# Patient Record
Sex: MALE | Race: ASIAN | NOT HISPANIC OR LATINO
[De-identification: names, ages, dates, MRNs, and addresses within clinical notes are randomized per-mention and may not be internally consistent; named-entity substitution may affect disease eponyms.]

---

## 2017-05-16 ENCOUNTER — TRANSCRIPTION ENCOUNTER (OUTPATIENT)
Age: 27
End: 2017-05-16

## 2017-06-13 ENCOUNTER — TRANSCRIPTION ENCOUNTER (OUTPATIENT)
Age: 27
End: 2017-06-13

## 2018-04-07 ENCOUNTER — TRANSCRIPTION ENCOUNTER (OUTPATIENT)
Age: 28
End: 2018-04-07

## 2018-04-13 ENCOUNTER — TRANSCRIPTION ENCOUNTER (OUTPATIENT)
Age: 28
End: 2018-04-13

## 2019-06-07 ENCOUNTER — TRANSCRIPTION ENCOUNTER (OUTPATIENT)
Age: 29
End: 2019-06-07

## 2020-03-23 ENCOUNTER — TRANSCRIPTION ENCOUNTER (OUTPATIENT)
Age: 30
End: 2020-03-23

## 2020-04-26 ENCOUNTER — MESSAGE (OUTPATIENT)
Age: 30
End: 2020-04-26

## 2020-05-06 ENCOUNTER — APPOINTMENT (OUTPATIENT)
Dept: DISASTER EMERGENCY | Facility: HOSPITAL | Age: 30
End: 2020-05-06

## 2020-05-07 LAB
SARS-COV-2 IGG SERPL IA-ACNC: 2.8 INDEX
SARS-COV-2 IGG SERPL QL IA: POSITIVE

## 2022-08-26 ENCOUNTER — APPOINTMENT (OUTPATIENT)
Dept: CT IMAGING | Facility: CLINIC | Age: 32
End: 2022-08-26

## 2022-09-02 ENCOUNTER — APPOINTMENT (OUTPATIENT)
Dept: CT IMAGING | Facility: CLINIC | Age: 32
End: 2022-09-02

## 2022-09-02 ENCOUNTER — OUTPATIENT (OUTPATIENT)
Dept: OUTPATIENT SERVICES | Facility: HOSPITAL | Age: 32
LOS: 1 days | End: 2022-09-02
Payer: COMMERCIAL

## 2022-09-02 DIAGNOSIS — Z00.8 ENCOUNTER FOR OTHER GENERAL EXAMINATION: ICD-10-CM

## 2022-09-02 PROCEDURE — 75574 CT ANGIO HRT W/3D IMAGE: CPT | Mod: 26

## 2022-09-02 PROCEDURE — 75574 CT ANGIO HRT W/3D IMAGE: CPT

## 2022-12-01 ENCOUNTER — NON-APPOINTMENT (OUTPATIENT)
Age: 32
End: 2022-12-01

## 2022-12-02 ENCOUNTER — APPOINTMENT (OUTPATIENT)
Dept: ORTHOPEDIC SURGERY | Facility: CLINIC | Age: 32
End: 2022-12-02

## 2022-12-02 VITALS
DIASTOLIC BLOOD PRESSURE: 89 MMHG | SYSTOLIC BLOOD PRESSURE: 134 MMHG | HEART RATE: 80 BPM | BODY MASS INDEX: 28.93 KG/M2 | HEIGHT: 66 IN | WEIGHT: 180 LBS

## 2022-12-02 DIAGNOSIS — M25.529 PAIN IN UNSPECIFIED ELBOW: ICD-10-CM

## 2022-12-02 PROCEDURE — 99203 OFFICE O/P NEW LOW 30 MIN: CPT

## 2022-12-02 PROCEDURE — 73070 X-RAY EXAM OF ELBOW: CPT | Mod: RT

## 2022-12-03 NOTE — HISTORY OF PRESENT ILLNESS
[de-identified] : JENNIE ORTIZ  is a 32 year year old right-hand-dominant M who presents with right elbow injury.  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  He says that he immediately saw and felt his biceps retract into his upper arm.  After this he had pain and swelling about the elbow which she rated as a 6-7 out of 10.  He went to an urgent care where he was given a sling and told to follow-up.  He reports that he currently feels pain when he tries to supinate his forearm.  He denies any previous injuries like this.\par

## 2022-12-03 NOTE — DISCUSSION/SUMMARY
[de-identified] : Right elbow distal biceps tear.  After long discussion with him and his wife regarding his diagnosis and management.  He appears to have had a tear of the biceps from the radial tuberosity.  This is evidenced by his history and exam findings.  I would like to order an MRI of the right elbow to confirm this.  I discussed with him that with a distal biceps tendon tear that he has, he could expect weakness with supination of the forearm with nonoperative management.  Given that he is relatively young and active and has to use his arm to lift things at work, I would recommend surgery in the form of a distal biceps repair to give him the best elbow function in the future.  I discussed that surgery would include retrieving the torn biceps tendon and then repairing it to the bone likely using a button or anchors.  This would be outpatient surgery and he would be able to go home the same day.  I also discussed the expected rehabilitation protocol which would include the use of a sling for the first 4 to 6 weeks.  He would then do physical therapy in order to improve the range of motion of the elbow.  At 3 months he would do physical therapy in order to work on strengthening of the elbow.  Total time until he is able to return to physical activities would be about 5 to 6 months.  Risks of surgery including but not limited to infection, bleeding, damage to surrounding nerves and blood vessels, need for further surgery were discussed.  I discussed with him that if he does want to pursue operative management, I would recommend that he does this sooner rather than later as if he does wait the biceps tendon can get scarred and retracted further.  After this thorough discussion, he indicated that he would like to proceed with operative management.  We will schedule him for surgery next Thursday.  He will get the MRI in the meantime for surgical planning.  The patient expressed understanding of his diagnosis and treatment plan and all questions were answered.\par \par This note was generated using dragon medical dictation software.  A reasonable effort has been made for proofreading its contents, but typos may still remain.  If there are any questions or points of clarification needed please notify my office.

## 2022-12-03 NOTE — PHYSICAL EXAM
[de-identified] : General: No apparent distress\par Cardiovascular: extremities warm and well-perfused, no cyanosis\par Pulmonary: non labored respirations\par \par Musculoskeletal:\par Left Elbow: \par Skin: Intact, dry, no swelling\par Motor and sensory intact, fingers warm and well-perfused\par Tenderness: None\par Negative hook test\par \par Right Elbow: \par Skin: Swelling and ecchymosis about antecubital fossa\par Motor: Fires FPL/EPL/ALEX/wrist extensors\par Sensory:  SILT median/ulnar/radial distributions\par Vascular: Fingers warm and well-perfused\par Tenderness: Tenderness to palpation about antecubital fossa\par Positive Andrea sign\par Positive hook test\par Weakness with resisted supination [de-identified] : 2 views of the right elbow obtained today and interpreted by me demonstrate no acute fracture or dislocation.  There are no degenerative changes about the elbow

## 2022-12-05 ENCOUNTER — OUTPATIENT (OUTPATIENT)
Dept: OUTPATIENT SERVICES | Facility: HOSPITAL | Age: 32
LOS: 1 days | End: 2022-12-05
Payer: COMMERCIAL

## 2022-12-05 ENCOUNTER — APPOINTMENT (OUTPATIENT)
Dept: MRI IMAGING | Facility: CLINIC | Age: 32
End: 2022-12-05

## 2022-12-05 ENCOUNTER — APPOINTMENT (OUTPATIENT)
Dept: ORTHOPEDIC SURGERY | Facility: CLINIC | Age: 32
End: 2022-12-05

## 2022-12-05 VITALS
DIASTOLIC BLOOD PRESSURE: 87 MMHG | BODY MASS INDEX: 29.99 KG/M2 | HEART RATE: 94 BPM | WEIGHT: 180 LBS | SYSTOLIC BLOOD PRESSURE: 135 MMHG | HEIGHT: 65 IN

## 2022-12-05 DIAGNOSIS — M25.529 PAIN IN UNSPECIFIED ELBOW: ICD-10-CM

## 2022-12-05 PROCEDURE — 73221 MRI JOINT UPR EXTREM W/O DYE: CPT | Mod: 26,RT

## 2022-12-05 PROCEDURE — 99214 OFFICE O/P EST MOD 30 MIN: CPT

## 2022-12-05 PROCEDURE — 73221 MRI JOINT UPR EXTREM W/O DYE: CPT

## 2022-12-06 NOTE — DISCUSSION/SUMMARY
[de-identified] : 31 y/o male with right distal biceps tendon rupture\par \par Patient presents with an acute injury to the right elbow consistent with complete distal biceps tendon avulsion. Discussed risk factors associated including hypervascularity of the tendon, intrinsic degeneration, and possible mechanical impingement. Mechanism of injury typically includes eccentric tension from a flexed to extended elbow position. Discussed treatment alternatives including nonoperative versus operative intervention. Acute operative intervention would repair the tendon back to the radial tuberosity to improve long-term function/strength. Nonoperative management would provide possible functional deficits including loss of 50% of supination strength, 30% of elbow flexion strength and possibly some  strength. \par \par History is consistent with an acute injury to the antecubital fossa, and physical exam suggests deformity to the biceps, loss of supination/flexion strength and positive hook test. I discussed the utility of MRI to distinguish degree of tendon retraction, and degree of tearing. He is scheduled for one today as he has already been evaluated by Dr. Lizama. I discussed that Dr. Lizama has already scheduled him for thursday and that I may not be able to operate any sooner, and that he is a highly qualified surgeon who performs this procedure routinely. We also discussed that ease of surgery is improved in the first couple of weeks post-injury. \par \par All risks, benefits and alternatives to the proposed surgical procedure, right distal biceps tendon repair as well as the need for formal post-operative rehabilitation were discussed in great detail with the patient. Risks include but are not limited to pain, bleeding, infection, neurovascular injury, stiffness, numbness, failure, medical complications (including DVT, PE, MI), and risks of anesthesia. \par \par A discussion was also had with the patient regarding additional precautions during surgery given the recent Covid-19 pandemic; specifically with regards to perioperative care, visitor policy, and pre-admission testing. The patient understands that current protocol requires either documented Covid-19 vaccination or a negative Covid-19 test no more than 48hrs prior to surgery. \par \par Patient questions and concerns were answered. He will obtain MRI today and follow up with Dr. Lizama or myself for surgical scheduling as his schedule allows.. \par \par

## 2022-12-06 NOTE — PHYSICAL EXAM
[de-identified] : Oriented to time, place, person\par Mood: Normal\par Affect: Normal\par Appearance: Healthy, well appearing, no acute distress.\par Gait: Normal\par Assistive Devices: None\par \par Right elbow exam:\par \par Skin: Clean, dry, intact. + medial ecchymosis. Mild antecubital swelling. No palpable joint effusion. Deformity to biceps\par ROM: Mild loss terminal motion, full supination/pronation.\par Painful ROM: Pain with extension/supination\par Tenderness: TTP to radial tuberosity, retracted biceps\par Strength: 4/5 elbow flexion, 5/5 elbow extension, 3/5 supination, 5/5 pronation\par Stability: Stable to vaus/valgus stress\par Vasc: 2+ radial pulse, <2s cap refill\par Sensation: In tact to light touch throughout\par Neuro: Negative tinels at ulnar canal, AIN/PIN/Ulnar nerve in tact to motor/sensation  [de-identified] : Images were reviewed dated 12.2.2022\par \par 2 views of the right elbow that show no acute fracture or dislocation. There is no degenerative change seen. There is no gross malalignment. No significant other obvious osseous abnormality, otherwise unremarkable.

## 2022-12-06 NOTE — ADDENDUM
[FreeTextEntry1] : This note was written by Nicole Craig on 12/05/2022 acting solely as a scribe for Dr. Guanako Sprague.\par \par All medical record entries made by the Scribe were at my, Dr. Guanako Sprague, direction and personally dictated by me on 12/05/2022. I have personally reviewed the chart and agree that the record accurately reflects my personal performance of the history, physical exam, assessment and plan.

## 2022-12-06 NOTE — HISTORY OF PRESENT ILLNESS
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 He felt a pop in his elbow lifting a box. He was seen by francisco Gupta with distal biceps tendon rupture and scheduled for sx. He is here for second opinion. The pain is brought on with rotation at the elbow and is taking Ibuprofen. He has MRI scheduled for today. Denies numbness or tingling. \par \par The patient's past medical history, past surgical history, medications and allergies were reviewed by me today with the patient and documented accordingly. In addition, the patient's family and social history, which were noncontributory to this visit, were reviewed also.

## 2022-12-07 ENCOUNTER — OUTPATIENT (OUTPATIENT)
Dept: OUTPATIENT SERVICES | Facility: HOSPITAL | Age: 32
LOS: 1 days | End: 2022-12-07

## 2022-12-07 ENCOUNTER — APPOINTMENT (OUTPATIENT)
Dept: ORTHOPEDIC SURGERY | Facility: CLINIC | Age: 32
End: 2022-12-07

## 2022-12-07 VITALS
TEMPERATURE: 98 F | SYSTOLIC BLOOD PRESSURE: 116 MMHG | HEART RATE: 77 BPM | OXYGEN SATURATION: 99 % | RESPIRATION RATE: 16 BRPM | WEIGHT: 190.04 LBS | HEIGHT: 64 IN | DIASTOLIC BLOOD PRESSURE: 78 MMHG

## 2022-12-07 DIAGNOSIS — J45.909 UNSPECIFIED ASTHMA, UNCOMPLICATED: ICD-10-CM

## 2022-12-07 DIAGNOSIS — Z96.22 MYRINGOTOMY TUBE(S) STATUS: Chronic | ICD-10-CM

## 2022-12-07 DIAGNOSIS — S46.211A STRAIN OF MUSCLE, FASCIA AND TENDON OF OTHER PARTS OF BICEPS, RIGHT ARM, INITIAL ENCOUNTER: ICD-10-CM

## 2022-12-07 DIAGNOSIS — K08.409 PARTIAL LOSS OF TEETH, UNSPECIFIED CAUSE, UNSPECIFIED CLASS: Chronic | ICD-10-CM

## 2022-12-07 DIAGNOSIS — Z90.89 ACQUIRED ABSENCE OF OTHER ORGANS: Chronic | ICD-10-CM

## 2022-12-07 LAB
HCT VFR BLD CALC: 39.4 % — SIGNIFICANT CHANGE UP (ref 39–50)
HGB BLD-MCNC: 12.9 G/DL — LOW (ref 13–17)
MCHC RBC-ENTMCNC: 27.3 PG — SIGNIFICANT CHANGE UP (ref 27–34)
MCHC RBC-ENTMCNC: 32.7 GM/DL — SIGNIFICANT CHANGE UP (ref 32–36)
MCV RBC AUTO: 83.3 FL — SIGNIFICANT CHANGE UP (ref 80–100)
NRBC # BLD: 0 /100 WBCS — SIGNIFICANT CHANGE UP (ref 0–0)
NRBC # FLD: 0 K/UL — SIGNIFICANT CHANGE UP (ref 0–0)
PLATELET # BLD AUTO: 246 K/UL — SIGNIFICANT CHANGE UP (ref 150–400)
RBC # BLD: 4.73 M/UL — SIGNIFICANT CHANGE UP (ref 4.2–5.8)
RBC # FLD: 12.3 % — SIGNIFICANT CHANGE UP (ref 10.3–14.5)
SARS-COV-2 N GENE NPH QL NAA+PROBE: NOT DETECTED
WBC # BLD: 10.84 K/UL — HIGH (ref 3.8–10.5)
WBC # FLD AUTO: 10.84 K/UL — HIGH (ref 3.8–10.5)

## 2022-12-07 PROCEDURE — 93010 ELECTROCARDIOGRAM REPORT: CPT

## 2022-12-07 RX ORDER — IBUPROFEN 200 MG
1 TABLET ORAL
Qty: 0 | Refills: 0 | DISCHARGE

## 2022-12-07 RX ORDER — BUDESONIDE AND FORMOTEROL FUMARATE DIHYDRATE 160; 4.5 UG/1; UG/1
0 AEROSOL RESPIRATORY (INHALATION)
Qty: 0 | Refills: 0 | DISCHARGE

## 2022-12-07 NOTE — H&P PST ADULT - NSICDXPASTSURGICALHX_GEN_ALL_CORE_FT
PAST SURGICAL HISTORY:  H/O mastoidectomy     H/O wisdom tooth extraction     S/P tube myringotomy

## 2022-12-07 NOTE — H&P PST ADULT - HISTORY OF PRESENT ILLNESS
32 year old male with biceps tendon rupture last week, when lifting a heavy box. Pt presents today for presurgical evaluation for ... 32 year old male with biceps tendon rupture last week, when lifting a heavy box. Pt presents today for presurgical evaluation for Right Distal Biceps Tendon Repair.

## 2022-12-07 NOTE — H&P PST ADULT - PROBLEM SELECTOR PLAN 1
Pre-op instructions provided. Pt verbalized understanding.   Pepcid provided for GI prophylaxis.   Pt given detailed verbal and written instructions on chlorhexidine wash. Pt verbalized understanding with teachback.   Pt states he already had preop COVID testing.

## 2022-12-08 ENCOUNTER — APPOINTMENT (OUTPATIENT)
Dept: ORTHOPEDIC SURGERY | Facility: HOSPITAL | Age: 32
End: 2022-12-08

## 2022-12-08 ENCOUNTER — NON-APPOINTMENT (OUTPATIENT)
Age: 32
End: 2022-12-08

## 2022-12-08 ENCOUNTER — TRANSCRIPTION ENCOUNTER (OUTPATIENT)
Age: 32
End: 2022-12-08

## 2022-12-08 RX ORDER — OXYCODONE AND ACETAMINOPHEN 5; 325 MG/1; MG/1
5-325 TABLET ORAL
Qty: 30 | Refills: 0 | Status: ACTIVE | COMMUNITY
Start: 2022-12-08 | End: 1900-01-01

## 2022-12-09 ENCOUNTER — APPOINTMENT (OUTPATIENT)
Dept: ORTHOPEDIC SURGERY | Facility: AMBULATORY SURGERY CENTER | Age: 32
End: 2022-12-09

## 2022-12-09 ENCOUNTER — OUTPATIENT (OUTPATIENT)
Dept: OUTPATIENT SERVICES | Facility: HOSPITAL | Age: 32
LOS: 1 days | Discharge: HOME | End: 2022-12-09
Payer: COMMERCIAL

## 2022-12-09 ENCOUNTER — TRANSCRIPTION ENCOUNTER (OUTPATIENT)
Age: 32
End: 2022-12-09

## 2022-12-09 VITALS
HEART RATE: 76 BPM | RESPIRATION RATE: 16 BRPM | OXYGEN SATURATION: 100 % | DIASTOLIC BLOOD PRESSURE: 68 MMHG | SYSTOLIC BLOOD PRESSURE: 123 MMHG

## 2022-12-09 VITALS
HEIGHT: 64 IN | WEIGHT: 190.04 LBS | DIASTOLIC BLOOD PRESSURE: 70 MMHG | SYSTOLIC BLOOD PRESSURE: 122 MMHG | RESPIRATION RATE: 16 BRPM | HEART RATE: 85 BPM | OXYGEN SATURATION: 100 % | TEMPERATURE: 98 F

## 2022-12-09 DIAGNOSIS — Z90.89 ACQUIRED ABSENCE OF OTHER ORGANS: Chronic | ICD-10-CM

## 2022-12-09 DIAGNOSIS — S46.211A STRAIN OF MUSCLE, FASCIA AND TENDON OF OTHER PARTS OF BICEPS, RIGHT ARM, INITIAL ENCOUNTER: ICD-10-CM

## 2022-12-09 DIAGNOSIS — K08.409 PARTIAL LOSS OF TEETH, UNSPECIFIED CAUSE, UNSPECIFIED CLASS: Chronic | ICD-10-CM

## 2022-12-09 DIAGNOSIS — Z96.22 MYRINGOTOMY TUBE(S) STATUS: Chronic | ICD-10-CM

## 2022-12-09 PROCEDURE — 24342 REPAIR OF RUPTURED TENDON: CPT | Mod: RT

## 2022-12-09 DEVICE — SYS IMPL DELIVERY DISTAL BICEPS BC: Type: IMPLANTABLE DEVICE | Status: FUNCTIONAL

## 2022-12-09 RX ORDER — ACETAMINOPHEN 500 MG
2 TABLET ORAL
Qty: 0 | Refills: 0 | DISCHARGE

## 2022-12-09 RX ORDER — IBUPROFEN 200 MG
1 TABLET ORAL
Qty: 0 | Refills: 0 | DISCHARGE

## 2022-12-09 RX ORDER — BUDESONIDE AND FORMOTEROL FUMARATE DIHYDRATE 160; 4.5 UG/1; UG/1
1 AEROSOL RESPIRATORY (INHALATION)
Qty: 0 | Refills: 0 | DISCHARGE

## 2022-12-09 NOTE — ASU PREOP CHECKLIST - SELECT TESTS ORDERED
Results in MD note/COVID-19 Clindamycin Counseling: I counseled the patient regarding use of clindamycin as an antibiotic for prophylactic and/or therapeutic purposes. Clindamycin is active against numerous classes of bacteria, including skin bacteria. Side effects may include nausea, diarrhea, gastrointestinal upset, rash, hives, yeast infections, and in rare cases, colitis.

## 2022-12-09 NOTE — ASU DISCHARGE PLAN (ADULT/PEDIATRIC) - NS MD DC FALL RISK RISK
For information on Fall & Injury Prevention, visit: [email or web address] OR  [email or web address] OR  https://www.cdc.gov/steadi/patient.html

## 2022-12-09 NOTE — ASU DISCHARGE PLAN (ADULT/PEDIATRIC) - CARE PROVIDER_API CALL
Guanako Sprague)  Orthopedics  [address]  [place] [address]  Phone: [phone]  Fax: [phone]  Follow Up Time:

## 2022-12-20 ENCOUNTER — APPOINTMENT (OUTPATIENT)
Dept: ORTHOPEDIC SURGERY | Facility: CLINIC | Age: 32
End: 2022-12-20

## 2022-12-20 PROBLEM — I38 ENDOCARDITIS, VALVE UNSPECIFIED: Chronic | Status: ACTIVE | Noted: 2022-12-07

## 2022-12-20 PROBLEM — J45.909 UNSPECIFIED ASTHMA, UNCOMPLICATED: Chronic | Status: ACTIVE | Noted: 2022-12-07

## 2022-12-20 PROCEDURE — 99024 POSTOP FOLLOW-UP VISIT: CPT

## 2022-12-20 PROCEDURE — 73070 X-RAY EXAM OF ELBOW: CPT | Mod: LT

## 2022-12-20 NOTE — HISTORY OF PRESENT ILLNESS
[Clean/Dry/Intact] : clean, dry and intact [Healed] : healed [Xray (Date:___)] : [unfilled] Xray -  [Doing Well] : is doing well [Excellent Pain Control] : has excellent pain control [No Sign of Infection] : is showing no signs of infection [Chills] : no chills [Fever] : no fever [Nausea] : no nausea [Vomiting] : no vomiting [Erythema] : not erythematous [Discharge] : absent of discharge [Swelling] : not swollen [Dehiscence] : not dehisced [de-identified] : R distal biceps tendon repair 12/06/22 [de-identified] : 32 year old male s/p R distal biceps tendon repair. He is doing well. He developed sinusitis post op and is taking Augmentin for it. He is happy with post surgical results. \par  [de-identified] : left elbow exam\par \par Skin: Clean, dry intact, no ecchymosis. No swelling. No palpable joint effusion.\par ROM: \par Tenderness: Diffuse throughout\par Strength: Not tested\par Stability: Stable to Vaus/valgus stress\par VAsc: 2+ radial pulse, <2s cap refill\par Sensation: In tact to light touch throughout\par Neuro: Negative tinels at ulnar canal, AIN/PIN/Ulnar nerve in tact to motor/sensation.The surgical incision sites was clean, dry and intact, healed, not erythematous, absent of discharge, not swollen and not dehisced. Additional findings included an unremarkable neurological exam and peripheral vascular exam normal.\par  [de-identified] : Imaging: The following radiographs were ordered and read by me during this patient visit. I reviewed each radiograph in detail with the patient and discussed the findings as highlighted below.\par \par 2 views of the left elbow were obtained today,               , that show interval change of an anatomic distal biceps repair. No evidence of hardware failure, otherwise unremarkable.\par  [de-identified] : All questions were answered regarding\par surgical procedure as well as immediate post-operative recovery period. We also discussed the post-operative rehabilitation program in great detail.\par \par Recommendations:\par 1. PT evaluation and treatment as per protocol provided (Rx given). HEP as instructed.\par 2. Brace: Discussed use of hinged elbow brace, removal for hygeine and HEP.\par 3. Meds: Wean pain medication, may transition to Tylenol/NSAID's as tolerated.\par 4. Ice/cryocuff as needed\par 5. Restrictions: As discussed \par \par Followup 4 weeks for repeat clinical evaluation.\par

## 2023-01-17 ENCOUNTER — APPOINTMENT (OUTPATIENT)
Dept: ORTHOPEDIC SURGERY | Facility: CLINIC | Age: 33
End: 2023-01-17
Payer: COMMERCIAL

## 2023-01-17 VITALS
SYSTOLIC BLOOD PRESSURE: 136 MMHG | BODY MASS INDEX: 28.93 KG/M2 | WEIGHT: 180 LBS | HEIGHT: 66 IN | DIASTOLIC BLOOD PRESSURE: 86 MMHG

## 2023-01-17 PROCEDURE — 99024 POSTOP FOLLOW-UP VISIT: CPT

## 2023-01-18 NOTE — HISTORY OF PRESENT ILLNESS
[Clean/Dry/Intact] : clean, dry and intact [Healed] : healed [Doing Well] : is doing well [Excellent Pain Control] : has excellent pain control [No Sign of Infection] : is showing no signs of infection [Chills] : no chills [Fever] : no fever [Nausea] : no nausea [Vomiting] : no vomiting [Erythema] : not erythematous [Discharge] : absent of discharge [Swelling] : not swollen [Dehiscence] : not dehisced [de-identified] : 33 y/o male s/p right distal biceps tendon repair 12/06/22 [de-identified] : 31 y/o male s/p right distal biceps tendon repair  He is doing well. He presents in post op brace. He has been attending PT 2 x per week.  He is happy with post surgical results. \par  [de-identified] : Right elbow exam\par \par Skin: Clean, dry intact. No swelling. No palpable joint effusion.\par ROM: \par Tenderness: minimal diffuse throughout\par Strength: Not tested\par Stability: Stable to Vaus/valgus stress\par VAsc: 2+ radial pulse, <2s cap refill\par Sensation: In tact to light touch throughout\par Neuro: Negative tinels at ulnar canal, AIN/PIN/Ulnar nerve in tact to motor/sensation. [de-identified] : 31 y/o male s/p right distal biceps tendon repair\par \par Patient is doing well at this time following surgical intervention. Denies any immediate postoperative complications. Progressing well with physical therapy. Discussion was had with the patient regarding the next phase of physical therapy as instructed. \par \par Recommendations:\par 1. Continue PT treatment as per protocol (Updated Rx given). HEP as instructed.\par 2. Brace: Discontinue brace.\par 3. Meds: Tylenol/NSAID's as tolerated.\par 4. Ice as needed after activity/HEP\par 5. Restrictions: None\par \par Followup 6 weeks for repeat clinical evaluation.

## 2023-01-18 NOTE — ADDENDUM
[FreeTextEntry1] : This note was written by Nicole Craig on 01/17/2023 acting solely as a scribe for Dr. Guanako Sprague.\par \par All medical record entries made by the Scribe were at my, Dr. Guanako Sprague, direction and personally dictated by me on 01/17/2023. I have personally reviewed the chart and agree that the record accurately reflects my personal performance of the history, physical exam, assessment and plan.

## 2023-03-02 ENCOUNTER — APPOINTMENT (OUTPATIENT)
Dept: ORTHOPEDIC SURGERY | Facility: CLINIC | Age: 33
End: 2023-03-02
Payer: COMMERCIAL

## 2023-03-02 PROCEDURE — 99024 POSTOP FOLLOW-UP VISIT: CPT

## 2023-03-10 NOTE — ADDENDUM
[FreeTextEntry1] : This note was written by Nicole Craig on 03/02/2023 acting solely as a scribe for Dr. Guanako Sprague.\par \par All medical record entries made by the Scribe were at my, Dr. Guanako Sprague, direction and personally dictated by me on 03/02/2023. I have personally reviewed the chart and agree that the record accurately reflects my personal performance of the history, physical exam, assessment and plan.

## 2023-03-10 NOTE — HISTORY OF PRESENT ILLNESS
[Clean/Dry/Intact] : clean, dry and intact [Healed] : healed [Doing Well] : is doing well [Excellent Pain Control] : has excellent pain control [No Sign of Infection] : is showing no signs of infection [Chills] : no chills [Fever] : no fever [Nausea] : no nausea [Vomiting] : no vomiting [Erythema] : not erythematous [Discharge] : absent of discharge [Swelling] : not swollen [Dehiscence] : not dehisced [de-identified] : 33 y/o male s/p right distal biceps tendon repair 12/06/22 [de-identified] : 33 y/o male s/p right distal biceps tendon repair  He is doing well.  C/o stiffness in the shoulder and has pain with external rotation of the shoulder, but improving. He has been attending PT 2 x per week.  \par  [de-identified] : Right elbow exam\par \par Skin: Clean, dry intact. No swelling. No palpable joint effusion.\par ROM: 0-120, full supination/pronation\par Tenderness: minimal diffuse throughout\par Strength: Not tested\par Stability: Stable to Vaus/valgus stress\par VAsc: 2+ radial pulse, <2s cap refill\par Sensation: In tact to light touch throughout\par Neuro: Negative tinels at ulnar canal, AIN/PIN/Ulnar nerve in tact to motor/sensation. [de-identified] : 31 y/o male s/p right distal biceps tendon repair\par \par Patient is doing well at this time following surgical intervention. Denies any immediate postoperative complications. Progressing well with physical therapy. Discussion was had with the patient regarding the next phase of physical therapy as instructed.  Shoulder stiffness likely contributable to being in sling for period of time postoperatively.  We will continue to watch closely.\par \par Recommendations:\par 1. Continue PT treatment as per protocol (Updated Rx given). HEP for terminal ROM exercises / strengthening and conditioning.\par 2. Meds: Tylenol/NSAID's as tolerated.\par 3. Ice PRN\par 4. Return to ADL's, light activity as instructed.\par \par Followup 3 months.

## 2023-03-31 ENCOUNTER — NON-APPOINTMENT (OUTPATIENT)
Age: 33
End: 2023-03-31

## 2023-04-27 ENCOUNTER — APPOINTMENT (OUTPATIENT)
Dept: ORTHOPEDIC SURGERY | Facility: CLINIC | Age: 33
End: 2023-04-27
Payer: COMMERCIAL

## 2023-04-27 DIAGNOSIS — S46.211D STRAIN OF MUSCLE, FASCIA AND TENDON OF OTHER PARTS OF BICEPS, RIGHT ARM, SUBSEQUENT ENCOUNTER: ICD-10-CM

## 2023-04-27 PROCEDURE — 99213 OFFICE O/P EST LOW 20 MIN: CPT

## 2023-04-28 PROBLEM — S46.211D RUPTURE OF RIGHT DISTAL BICEPS TENDON, SUBSEQUENT ENCOUNTER: Status: ACTIVE | Noted: 2022-12-06

## 2023-04-28 NOTE — ADDENDUM
[FreeTextEntry1] : This note was written by Nicole Craig on 04/27/2023 acting solely as a scribe for Dr. Guanako Sprague.\par \par All medical record entries made by the Scribe were at my, Dr. Guanako Sprague, direction and personally dictated by me on 04/27/2023. I have personally reviewed the chart and agree that the record accurately reflects my personal performance of the history, physical exam, assessment and plan.

## 2023-04-28 NOTE — DISCUSSION/SUMMARY
[de-identified] : 33 y/o male s/p right distal biceps tendon repair\par \par Patient is doing well at this time following surgical intervention. Denies any immediate postoperative complications. Progressing well with physical therapy. Discussion was had with the patient regarding the next phase of physical therapy as instructed.  He has some residual complaints of some stiffness to the right shoulder, which I discussed is likely due to prolonged disability to the upper extremity.  Would reserve further treatment until conclusion of elbow rehabilitation.\par \par Recommendations:\par 1. Continue PT treatment as per protocol (Updated Rx given). HEP for terminal ROM exercises / strengthening and conditioning.\par 2. Meds: Tylenol/NSAID's as tolerated.\par 3. Ice PRN\par 4. Return to ADL's, light activity as instructed.\par \par Followup 3 months.

## 2023-04-28 NOTE — PHYSICAL EXAM
[de-identified] : Oriented to time, place, person\par Mood: Normal\par Affect: Normal\par Appearance: Healthy, well appearing, no acute distress.\par Gait: Normal\par Assistive Devices: None \par \par Right elbow exam\par \par Skin: Clean, dry intact. No swelling. No palpable joint effusion.\par ROM: 0-120, full supination/pronation\par Tenderness: minimal diffuse throughout\par Strength: Not tested\par Stability: Stable to Vaus/valgus stress\par VAsc: 2+ radial pulse, <2s cap refill\par Sensation: In tact to light touch throughout\par Neuro: Negative tinels at ulnar canal, AIN/PIN/Ulnar nerve in tact to motor/sensation.

## 2023-04-28 NOTE — HISTORY OF PRESENT ILLNESS
[de-identified] : 32 year old male presents today s/p right distal biceps tendon repair 12/06/22. He is attending PT 2 x per week and has been making progress. He is able to curl 10lbs. C/O Right shoulder stiffness. Denies pain in the elbow. He is not taking pain medication.

## 2023-07-10 ENCOUNTER — APPOINTMENT (OUTPATIENT)
Dept: ORTHOPEDIC SURGERY | Facility: CLINIC | Age: 33
End: 2023-07-10
Payer: COMMERCIAL

## 2023-07-10 VITALS — WEIGHT: 180 LBS | BODY MASS INDEX: 29.99 KG/M2 | HEIGHT: 65 IN

## 2023-07-10 PROCEDURE — 73030 X-RAY EXAM OF SHOULDER: CPT | Mod: RT

## 2023-07-10 PROCEDURE — 99213 OFFICE O/P EST LOW 20 MIN: CPT

## 2023-07-11 NOTE — ADDENDUM
[FreeTextEntry1] : This note was written by Nicole Craig on 07/10/2023 acting solely as a scribe for Dr. Guanako Sprague.\par \par All medical record entries made by the Scribe were at my, Dr. Guanako Sprague, direction and personally dictated by me on 07/10/2023. I have personally reviewed the chart and agree that the record accurately reflects my personal performance of the history, physical exam, assessment and plan.

## 2023-07-11 NOTE — HISTORY OF PRESENT ILLNESS
[de-identified] : 32 year old male presents today s/p right distal biceps tendon repair 12/06/22. He completed PT last month.  C/O continued right shoulder stiffness/pain. Denies pain/or any loss of function in the elbow. He is not taking pain medication.

## 2023-07-11 NOTE — PHYSICAL EXAM
[de-identified] : Oriented to time, place, person\par Mood: Normal\par Affect: Normal\par Appearance: Healthy, well appearing, no acute distress.\par Gait: Normal\par Assistive Devices: None\par \par Right shoulder exam:\par \par Inspection: No malalignment, No defects, No atrophy\par Skin: No masses, No lesions\par Neck: Negative Spurling, full ROM, no pain with ROM\par AROM: FF to 180, abduction to 90, ER to 70, IR to upper lumbar\par Painful arc ROM: none\par Tenderness: No bicipital tenderness, minimal tenderness to greater tuberosity/RTC insertion, no anterior shoulder/lesser tuberosity tenderness\par Strength: 5/5 ER, 5/5 IR in adduction, 5/5 supraspinatus testing, negative [place]'s test\par AC joint: No TTP/pain with cross arm testing\par Biceps: Speed Negative, Yergason Negative \par Impingement test: +Damian, +Neer\par Vasc: 2+ radial pulse \par Stability: Stable \par Neuro: AIN, PIN, Ulnar nerve intact to motor\par Sensation: Intact to light touch throughout  [de-identified] : The following radiographs were ordered and read by me during this patients visit. I reviewed each radiograph in detail with the patient and discussed the findings as highlighted below.\par \par 3 views of right shoulder were obtained today, 07/10/2023, that show no acute fracture or dislocation. There is no glenohumeral and no AC joint degenerative change seen. Type I acromion. There is no significant malalignment. No significant other obvious osseous abnormality, otherwise unremarkable.

## 2023-07-11 NOTE — DISCUSSION/SUMMARY
[de-identified] : 31 y/o male with right shoulder pain. \par \par A discussion was had with the patient regarding potential sources of inflammatory shoulder discomfort; including rotator cuff tendon dysfunction (including tendonitis vs. internal structural damage), subdeltoid/subacromial bursitis, or impingement of the rotator cuff at the acromion. Other contributing sources of pain may be the acromioclavicular joint or long head of the biceps tendon. Irritation is typically caused either by overhead repetitive activity or as a result of minor injury. \par \par Discussed short-term and long-term outcomes as well as the goal of treatment to reduce pain and restore function. Nonsurgical treatment is typically first-line therapy that may take weeks to months to resolve symptoms; includes rest from overhead activities, NSAIDs, home exercise program versus physical therapy to restore normal strength/ROM/function of the shoulder, and possible corticosteroid injection. Also discussed the role of arthroscopic surgical intervention when nonsurgical treatment does not adequately relieve pain/inflammation. \par \par MRI Rx given to patient to further delineate any internal structural derangement to the shoulder. This will allow for better understanding of the severity of disease, and allow for better stratification of treatment strategies (surgical vs. non-surgical). Patient is agreeable to further imaging. \par \par Followup: After MRI

## 2023-07-12 ENCOUNTER — APPOINTMENT (OUTPATIENT)
Dept: ORTHOPEDIC SURGERY | Facility: CLINIC | Age: 33
End: 2023-07-12
Payer: COMMERCIAL

## 2023-07-12 ENCOUNTER — NON-APPOINTMENT (OUTPATIENT)
Age: 33
End: 2023-07-12

## 2023-07-12 VITALS
WEIGHT: 180 LBS | HEIGHT: 65 IN | DIASTOLIC BLOOD PRESSURE: 83 MMHG | SYSTOLIC BLOOD PRESSURE: 130 MMHG | BODY MASS INDEX: 29.99 KG/M2

## 2023-07-12 PROCEDURE — 99214 OFFICE O/P EST MOD 30 MIN: CPT

## 2023-07-12 PROCEDURE — 72082 X-RAY EXAM ENTIRE SPI 2/3 VW: CPT

## 2023-07-12 RX ORDER — TIZANIDINE 2 MG/1
2 TABLET ORAL EVERY 6 HOURS
Qty: 24 | Refills: 0 | Status: ACTIVE | COMMUNITY
Start: 2023-07-12 | End: 1900-01-01

## 2023-07-12 NOTE — ASSESSMENT
[FreeTextEntry1] : I had a lengthy discussion with the patient in regards to treatment plan and diagnosis. There are no red flag findings on imaging nor are there any red flag findings on clinical exam.  Therefore we will proceed with a course of conservative treatment.  This would include physical therapy/home exercise program, Tylenol, NSAIDs as medically indicated.  The patient will follow up with me in approximately 3-4 weeks.  I encouraged the patient to follow-up sooner if there are any new or worsening symptoms.

## 2023-07-12 NOTE — PHYSICAL EXAM
[de-identified] : Cervical Physical Exam\par \par Gait - Normal\par \par Station - Normal\par \par Sagittal balance - Normal\par \par Compensatory mechanism? - None\par \par Horizontal gaze - Maintained\par \par Heel walk - Normal\par \par Toe walk - Normal\par \par Reflexes\par Biceps - Normal\par Triceps - Normal\par Brachioradialis - Normal\par Patellar - Normal\par Gastroc - Normal\par Clonus -No\par \par Guerrero´s - None\par \par Shoulder Exam - Normal\par \par Spurling´s - None\par \par Wrist Pulses -2+ radial/ulnar\par \par Foot Pulses -2+ DP/PT\par \par Cervical range of motion - Normal\par \par Sensation\par C5-T1 sensation intact to light touch bilaterally\par \par L1-S1 sensation intact to light touch bilaterally\par \par Motor\par \par 	Deltoid	Biceps	Triceps	WF	WE	IO	\par Right	5/5	5/5	5/5	5/5	5/5	5/5	5/5\par Left	5/5	5/5	5/5	5/5	5/5	5/5	5/5\par \par \par 	IP	Quad	HS	TA	Gastroc	EHL\par Right	5/5	5/5	5/5	5/5	5/5	5/5\par Left	5/5	5/5	5/5	5/5	5/5	5/5  [de-identified] : Scoliosis radiographs reviewed\par Slight coronal curvature noted\par No spondylolisthesis noted

## 2023-07-12 NOTE — HISTORY OF PRESENT ILLNESS
[de-identified] : 32 year old male who presents for initial evaluation of his back pain.  The patient does state that he has midsternal chest pain.  This is positional in nature.  When he lies down he does feel like this pain gets somewhat worse.  It is not worse with any sort of physical activity, he has no chest pain with exertion.  He has no issues with balance or hand dexterity.  He has no issues in terms of bowel bladder function.  The symptoms are acute

## 2023-07-31 ENCOUNTER — APPOINTMENT (OUTPATIENT)
Dept: MRI IMAGING | Facility: CLINIC | Age: 33
End: 2023-07-31
Payer: COMMERCIAL

## 2023-07-31 ENCOUNTER — OUTPATIENT (OUTPATIENT)
Dept: OUTPATIENT SERVICES | Facility: HOSPITAL | Age: 33
LOS: 1 days | End: 2023-07-31
Payer: COMMERCIAL

## 2023-07-31 DIAGNOSIS — Z96.22 MYRINGOTOMY TUBE(S) STATUS: Chronic | ICD-10-CM

## 2023-07-31 DIAGNOSIS — Z90.89 ACQUIRED ABSENCE OF OTHER ORGANS: Chronic | ICD-10-CM

## 2023-07-31 DIAGNOSIS — K08.409 PARTIAL LOSS OF TEETH, UNSPECIFIED CAUSE, UNSPECIFIED CLASS: Chronic | ICD-10-CM

## 2023-07-31 DIAGNOSIS — Z00.00 ENCOUNTER FOR GENERAL ADULT MEDICAL EXAMINATION WITHOUT ABNORMAL FINDINGS: ICD-10-CM

## 2023-07-31 PROCEDURE — 73221 MRI JOINT UPR EXTREM W/O DYE: CPT | Mod: 26,RT

## 2023-07-31 PROCEDURE — 72146 MRI CHEST SPINE W/O DYE: CPT | Mod: 26

## 2023-07-31 PROCEDURE — 73221 MRI JOINT UPR EXTREM W/O DYE: CPT

## 2023-07-31 PROCEDURE — 72146 MRI CHEST SPINE W/O DYE: CPT

## 2023-08-11 ENCOUNTER — NON-APPOINTMENT (OUTPATIENT)
Age: 33
End: 2023-08-11

## 2023-08-14 ENCOUNTER — APPOINTMENT (OUTPATIENT)
Dept: ORTHOPEDIC SURGERY | Facility: CLINIC | Age: 33
End: 2023-08-14
Payer: COMMERCIAL

## 2023-08-14 DIAGNOSIS — M25.511 PAIN IN RIGHT SHOULDER: ICD-10-CM

## 2023-08-14 PROCEDURE — 99214 OFFICE O/P EST MOD 30 MIN: CPT

## 2023-08-21 PROBLEM — M25.511 RIGHT SHOULDER PAIN: Status: ACTIVE | Noted: 2023-07-11

## 2023-08-21 NOTE — PHYSICAL EXAM
[de-identified] : Right shoulder exam:  Inspection: No malalignment, No defects, No atrophy Skin: No masses, No lesions Neck: Negative Spurling, full ROM, no pain with ROM AROM: FF to 180, abduction to 90, ER to 70, IR to upper lumbar Painful arc ROM: none Tenderness: No bicipital tenderness, minimal tenderness to greater tuberosity/RTC insertion, no anterior shoulder/lesser tuberosity tenderness Strength: 5/5 ER, 5/5 IR in adduction, 5/5 supraspinatus testing, negative Laurel's test AC joint: No TTP/pain with cross arm testing Biceps: Speed Negative, Yergason Negative  Impingement test: +Damian, +Neer Vasc: 2+ radial pulse  Stability: Stable  Neuro: AIN, PIN, Ulnar nerve intact to motor Sensation: Intact to light touch throughout  [de-identified] : 3 views of right shoulder were obtained 07/10/2023, that show no acute fracture or dislocation. There is no glenohumeral and no AC joint degenerative change seen. Type I acromion. There is no significant malalignment. No significant other obvious osseous abnormality, otherwise unremarkable.   MRI right shoulder dated 07/31/2023 shows two areas of subcentimeter low-grade interstitial tearing of the supraspinatus tendon. Infraspinatus focal low-grade interstitial tear with delamination along the myotendinous junction. No high-grade or retracted tendon tear.  We independently reviewed and discussed in detail the images and the radiologic reports with the patient.

## 2023-08-21 NOTE — DISCUSSION/SUMMARY
[de-identified] : 33 y/o male with right shoulder pain.   Patient presents for MRI review.  MRI shows two small areas of subcentimeter low-grade interstitial tearing of the supraspinatus tendon. A discussion was had with the patient regarding rotator cuff tendon dysfunction (including acute inflammatory tendonitis/bursitis versus chronic tendinopathy or internal structural derangement. Nonsurgical treatment is typically first-line therapy that may take weeks to months to resolve symptoms; includes rest from overhead activities, NSAIDs, home exercise program versus physical therapy to restore normal strength/ROM/function of the shoulder, and possible corticosteroid injection.   Recommendations: Begin trial of PT, Rx given. Conservative modalities as above (overhead activity rest/activity avoidance until less symptomatic, ice, NSAIDs if able, strengthening and conditioning program).   Followup: If symptoms progress or persist for additional treatment as needed.

## 2023-08-21 NOTE — ADDENDUM
[FreeTextEntry1] : This note was written by Nicole Craig on 08/14/2023 acting solely as a scribe for Dr. Guanako Sprague.  All medical record entries made by the Scribe were at my, Dr. Guanako Sprague, direction and personally dictated by me on 08/14/2023. I have personally reviewed the chart and agree that the record accurately reflects my personal performance of the history, physical exam, assessment and plan.

## 2023-08-30 ENCOUNTER — APPOINTMENT (OUTPATIENT)
Dept: ORTHOPEDIC SURGERY | Facility: CLINIC | Age: 33
End: 2023-08-30
Payer: COMMERCIAL

## 2023-08-30 DIAGNOSIS — M54.9 DORSALGIA, UNSPECIFIED: ICD-10-CM

## 2023-08-30 PROCEDURE — 99214 OFFICE O/P EST MOD 30 MIN: CPT

## 2023-08-30 NOTE — PHYSICAL EXAM
[de-identified] : Cervical Physical Exam\par  \par  Gait - Normal\par  \par  Station - Normal\par  \par  Sagittal balance - Normal\par  \par  Compensatory mechanism? - None\par  \par  Horizontal gaze - Maintained\par  \par  Heel walk - Normal\par  \par  Toe walk - Normal\par  \par  Reflexes\par  Biceps - Normal\par  Triceps - Normal\par  Brachioradialis - Normal\par  Patellar - Normal\par  Gastroc - Normal\par  Clonus -No\par  \par  Hoffmans - None\par  \par  Shoulder Exam - Normal\par  \par  Spurlings - None\par  \par  Wrist Pulses -2+ radial/ulnar\par  \par  Foot Pulses -2+ DP/PT\par  \par  Cervical range of motion - Normal\par  \par  Sensation\par  C5-T1 sensation intact to light touch bilaterally\par  \par  L1-S1 sensation intact to light touch bilaterally\par  \par  Motor\par  \par  	Deltoid	Biceps	Triceps	WF	WE	IO	\par  Right	5/5	5/5	5/5	5/5	5/5	5/5	5/5\par  Left	5/5	5/5	5/5	5/5	5/5	5/5	5/5\par  \par  \par  	IP	Quad	HS	TA	Gastroc	EHL\par  Right	5/5	5/5	5/5	5/5	5/5	5/5\par  Left	5/5	5/5	5/5	5/5	5/5	5/5  [de-identified] : Scoliosis radiographs reviewed Slight coronal curvature noted No spondylolisthesis noted  Thoracic MRI reviewed  no critical areas of stenosis noted  disc heights relatively well maintained

## 2023-08-30 NOTE — ADDENDUM
[FreeTextEntry1] : I, Namita Trujillo, acted solely as a scribe for Dr. Janes Davis MD on this date 08/30/2023    All medical record entries made by the Scribe were at my, Dr. Janes Davis MD., direction and personally dictated by me on 08/30/2023 . I have reviewed the chart and agree that the record accurately reflects my personal performance of the history, physical exam, assessment and plan. I have also personally directed, reviewed, and agreed with the chart.

## 2023-08-30 NOTE — HISTORY OF PRESENT ILLNESS
[de-identified] : 33 year old male who presents for follow-up evaluation of his back pain. Today, the patient reports his pain sxs have completely resolved with no associated chest pain. Patient is here today to review his thoracic MRI and discuss next steps. Of note, patient also reports lower back bumps and swelling that have been chronic in nature, reports no associated pain sxs.   07/12/2023 32 year old male who presents for initial evaluation of his back pain.  The patient does state that he has midsternal chest pain.  This is positional in nature.  When he lies down he does feel like this pain gets somewhat worse.  It is not worse with any sort of physical activity, he has no chest pain with exertion.  He has no issues with balance or hand dexterity.  He has no issues in terms of bowel bladder function.  The symptoms are acute

## 2023-09-06 ENCOUNTER — NON-APPOINTMENT (OUTPATIENT)
Age: 33
End: 2023-09-06

## 2024-06-12 ENCOUNTER — NON-APPOINTMENT (OUTPATIENT)
Age: 34
End: 2024-06-12

## 2024-06-14 ENCOUNTER — APPOINTMENT (OUTPATIENT)
Dept: ULTRASOUND IMAGING | Facility: CLINIC | Age: 34
End: 2024-06-14

## 2024-06-14 PROCEDURE — 76700 US EXAM ABDOM COMPLETE: CPT

## 2024-07-27 ENCOUNTER — NON-APPOINTMENT (OUTPATIENT)
Age: 34
End: 2024-07-27

## 2024-09-05 ENCOUNTER — TRANSCRIPTION ENCOUNTER (OUTPATIENT)
Age: 34
End: 2024-09-05

## (undated) DEVICE — DRAPE 3/4 SHEET 52X76"

## (undated) DEVICE — DRAPE TOWEL 1010

## (undated) DEVICE — DRSG STERISTRIPS 0.5 X 4"

## (undated) DEVICE — SUT NDL MAYO CATGUT 1/2 CIRCLE TAPER POINT 0.050" X 1.248"

## (undated) DEVICE — PREP CHLORAPREP HI-LITE ORANGE 26ML

## (undated) DEVICE — SUT VICRYL 2-0 27" CT-2 UNDYED

## (undated) DEVICE — DRAPE C ARM UNIVERSAL

## (undated) DEVICE — DRSG WEBRIL 4"

## (undated) DEVICE — SUT MONOCRYL 4-0 27" PS-2 UNDYED

## (undated) DEVICE — CANISTER DISPOSABLE THIN WALL 3000CC

## (undated) DEVICE — PACK HAND

## (undated) DEVICE — ARTHREX BIOTENODESIS KIT DISP

## (undated) DEVICE — SOL IRR POUR NS 0.9% 1500ML

## (undated) DEVICE — VENODYNE/SCD SLEEVE CALF MEDIUM

## (undated) DEVICE — DRSG COBAN 4"

## (undated) DEVICE — GLV 8 PROTEXIS (CREAM) NEU-THERA

## (undated) DEVICE — ELCTR GROUNDING PAD ADULT COVIDIEN

## (undated) DEVICE — SLING SHOULDER IMMOBILIZER CLINIC LARGE

## (undated) DEVICE — SUT VICRYL 2-0 27" SH UNDYED

## (undated) DEVICE — ELCTR BOVIE PENCIL BLADE 10FT

## (undated) DEVICE — POSITIONER STRAP ARMBOARD VELCRO TS-30

## (undated) DEVICE — WARMING BLANKET FULL ADULT

## (undated) DEVICE — BLADE SURGICAL #15 CARBON